# Patient Record
Sex: FEMALE | HISPANIC OR LATINO | URBAN - METROPOLITAN AREA
[De-identification: names, ages, dates, MRNs, and addresses within clinical notes are randomized per-mention and may not be internally consistent; named-entity substitution may affect disease eponyms.]

---

## 2018-07-27 NOTE — PATIENT DISCUSSION
PHOTOGRAPHS: I have reviewed the external ocular photographs of this patient which show the following: DERMATOCHALASIS OU, VISUALLY SIGNIFICANT

## 2020-07-29 NOTE — PATIENT DISCUSSION
PHOTOGRAPHS: I have reviewed the external ocular photographs of this patient which show the following: dermatochalasis both upper eyelids.

## 2020-08-27 NOTE — PATIENT DISCUSSION
Also, please do not hesitate to call us if you have any concerns not addressed by this information. Please call [phone] and we will do everything we can to help you during this period.

## 2020-09-11 NOTE — PATIENT DISCUSSION
Reassured patient that she is continuing to heal well following surgery. Explained that the suture line should gradually improve over the next few months. Patient expressed understanding. Follow up prn.

## 2020-09-23 ENCOUNTER — APPOINTMENT (RX ONLY)
Dept: URBAN - METROPOLITAN AREA CLINIC 94 | Facility: CLINIC | Age: 25
Setting detail: DERMATOLOGY
End: 2020-09-23

## 2020-09-23 VITALS — TEMPERATURE: 97.8 F

## 2020-09-23 DIAGNOSIS — L30.5 PITYRIASIS ALBA: ICD-10-CM

## 2020-09-23 DIAGNOSIS — L21.8 OTHER SEBORRHEIC DERMATITIS: ICD-10-CM

## 2020-09-23 PROCEDURE — ? FULL BODY SKIN EXAM - DECLINED

## 2020-09-23 PROCEDURE — ? COUNSELING

## 2020-09-23 PROCEDURE — 99202 OFFICE O/P NEW SF 15 MIN: CPT

## 2020-09-23 PROCEDURE — ? PRESCRIPTION

## 2020-09-23 PROCEDURE — ? TREATMENT REGIMEN

## 2020-09-23 RX ORDER — HYDROCORTISONE 25 MG/G
CREAM TOPICAL BID
Qty: 1 | Refills: 2 | Status: ERX | COMMUNITY
Start: 2020-09-23

## 2020-09-23 RX ORDER — KETOCONAZOLE 20 MG/ML
SHAMPOO, SUSPENSION TOPICAL TIW
Qty: 1 | Refills: 3 | Status: ERX | COMMUNITY
Start: 2020-09-23

## 2020-09-23 RX ADMIN — HYDROCORTISONE: 25 CREAM TOPICAL at 00:00

## 2020-09-23 RX ADMIN — KETOCONAZOLE: 20 SHAMPOO, SUSPENSION TOPICAL at 00:00

## 2020-09-23 ASSESSMENT — LOCATION DETAILED DESCRIPTION DERM
LOCATION DETAILED: INFERIOR MID FOREHEAD
LOCATION DETAILED: LEFT PROXIMAL PRETIBIAL REGION

## 2020-09-23 ASSESSMENT — LOCATION SIMPLE DESCRIPTION DERM
LOCATION SIMPLE: INFERIOR FOREHEAD
LOCATION SIMPLE: LEFT PRETIBIAL REGION

## 2020-09-23 ASSESSMENT — LOCATION ZONE DERM
LOCATION ZONE: LEG
LOCATION ZONE: FACE

## 2021-01-07 NOTE — PATIENT DISCUSSION
Patient was give a [employer] chemical peel, started with a prepping solution followed by several acids, each acid was left on for 5 minutes total and then neutralized. The patient tolerated the procedure well and was given aftercare instructions.

## 2021-01-28 NOTE — PATIENT DISCUSSION
2 vials of Voluma to cheeks, after appropriate consent with a careful review of the risks and benefits. The area was prepped with a chlorhexidine swab. The patient was given ice packs to minimize discomfort before, during, and after the procedure. The patient tolerated the procedure well and was given post procedural care instructions.

## 2021-02-04 ENCOUNTER — APPOINTMENT (RX ONLY)
Dept: URBAN - METROPOLITAN AREA CLINIC 352 | Facility: CLINIC | Age: 26
Setting detail: DERMATOLOGY
End: 2021-02-04

## 2021-02-04 DIAGNOSIS — D69.0 ALLERGIC PURPURA: ICD-10-CM

## 2021-02-04 PROCEDURE — ? PRESCRIPTION

## 2021-02-04 PROCEDURE — ? COUNSELING

## 2021-02-04 PROCEDURE — 99213 OFFICE O/P EST LOW 20 MIN: CPT

## 2021-02-04 RX ORDER — PREDNISONE 10 MG/1
TABLET ORAL QAM
Qty: 21 | Refills: 0 | Status: ERX | COMMUNITY
Start: 2021-02-04

## 2021-02-04 RX ORDER — HYDROCORTISONE 25 MG/G
CREAM TOPICAL
Qty: 1 | Refills: 0 | Status: ERX

## 2021-02-04 RX ADMIN — PREDNISONE: 10 TABLET ORAL at 00:00

## 2021-02-04 ASSESSMENT — LOCATION DETAILED DESCRIPTION DERM
LOCATION DETAILED: LEFT INFERIOR VERMILION LIP
LOCATION DETAILED: LEFT SUPERIOR VERMILION LIP

## 2021-02-04 ASSESSMENT — LOCATION ZONE DERM: LOCATION ZONE: LIP

## 2021-02-04 ASSESSMENT — LOCATION SIMPLE DESCRIPTION DERM: LOCATION SIMPLE: LEFT LIP

## 2021-02-11 ENCOUNTER — APPOINTMENT (RX ONLY)
Dept: URBAN - METROPOLITAN AREA CLINIC 352 | Facility: CLINIC | Age: 26
Setting detail: DERMATOLOGY
End: 2021-02-11

## 2021-02-11 DIAGNOSIS — D69.0 ALLERGIC PURPURA: ICD-10-CM

## 2021-02-11 PROCEDURE — ? COUNSELING

## 2021-02-11 PROCEDURE — 99212 OFFICE O/P EST SF 10 MIN: CPT

## 2021-02-11 ASSESSMENT — LOCATION ZONE DERM: LOCATION ZONE: LIP

## 2021-02-11 ASSESSMENT — LOCATION DETAILED DESCRIPTION DERM
LOCATION DETAILED: LEFT SUPERIOR VERMILION LIP
LOCATION DETAILED: LEFT INFERIOR VERMILION LIP

## 2021-02-11 ASSESSMENT — LOCATION SIMPLE DESCRIPTION DERM: LOCATION SIMPLE: LEFT LIP

## 2021-04-01 NOTE — PATIENT DISCUSSION
The patient has a normal amount of bruising and swelling consistent with the post operative course. There is no evidence of infection.  The post op course has been further reviewed with the patient

## 2021-04-01 NOTE — PATIENT DISCUSSION
Resume normal activity. Resume any medications that were discontinued for surgery. Artificial tears prn burning/itching/blurry vision. Wash incisions/ lash line once daily with baby shampoo. Reviewed skin care/ sun avoidance at length. Gave sunscreen today. Follow up in 1 month for continued observation, sooner for problems.

## 2021-04-29 NOTE — PATIENT DISCUSSION
Patient continues to heal well, great cosmetic outcome. Reviewed skin care/ sun avoidance. Gave eye cream today. Follow up prn.

## 2022-04-19 ENCOUNTER — NEW PATIENT (OUTPATIENT)
Dept: URBAN - METROPOLITAN AREA CLINIC 23 | Facility: CLINIC | Age: 27
End: 2022-04-19

## 2022-04-19 DIAGNOSIS — H52.223: ICD-10-CM

## 2022-04-19 DIAGNOSIS — Z01.00: ICD-10-CM

## 2022-04-19 PROCEDURE — 92310-2E ESTABLISHED CL PATIENT TORIC (ASTIGMATISM) SINGLE VISION SOFT LENS EVALUATION

## 2022-04-19 PROCEDURE — 92015 DETERMINE REFRACTIVE STATE: CPT

## 2022-04-19 PROCEDURE — 92004 COMPRE OPH EXAM NEW PT 1/>: CPT

## 2022-04-19 ASSESSMENT — VISUAL ACUITY
OS_SC: 20/400
OU_SC: 20/400
OU_CC: 20/20
OS_CC: 20/20
OD_SC: 20/400
OU_CC: 20/20
OD_CC: 20/30

## 2022-04-19 ASSESSMENT — TONOMETRY
OD_IOP_MMHG: 16
OS_IOP_MMHG: 16

## 2022-04-19 NOTE — PATIENT DISCUSSION
Toric CL fitting performed today with trials dispensed. Finalize rx. Reviewed proper wear and care of CL for best ocular health.